# Patient Record
Sex: FEMALE | Race: WHITE
[De-identification: names, ages, dates, MRNs, and addresses within clinical notes are randomized per-mention and may not be internally consistent; named-entity substitution may affect disease eponyms.]

---

## 2018-10-25 ENCOUNTER — HOSPITAL ENCOUNTER (INPATIENT)
Dept: HOSPITAL 74 - FER | Age: 83
LOS: 5 days | Discharge: NURSING, REHAB OR LONG-TERM CARE | DRG: 470 | End: 2018-10-30
Attending: NURSE PRACTITIONER | Admitting: INTERNAL MEDICINE
Payer: COMMERCIAL

## 2018-10-25 VITALS — BODY MASS INDEX: 19.5 KG/M2
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
## 2018-10-25 DIAGNOSIS — Z96.641: ICD-10-CM

## 2018-10-25 DIAGNOSIS — S72.112A: Primary | ICD-10-CM
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
## 2018-10-25 DIAGNOSIS — E83.42: ICD-10-CM

## 2018-10-25 DIAGNOSIS — D62: ICD-10-CM

## 2018-10-25 DIAGNOSIS — N39.0: ICD-10-CM

## 2018-10-25 DIAGNOSIS — S72.122A: ICD-10-CM
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
## 2018-10-25 LAB
ALBUMIN SERPL-MCNC: 3.8 G/DL (ref 3.5–5)
ALP SERPL-CCNC: 71 U/L (ref 32–92)
ALT SERPL-CCNC: 20 U/L (ref 10–40)
ANION GAP SERPL CALC-SCNC: 8 MMOL/L (ref 8–16)
AST SERPL-CCNC: 21 U/L (ref 10–42)
BACTERIA #/AREA URNS HPF: (no result) /HPF
BILIRUB SERPL-MCNC: 0.4 MG/DL (ref 0.2–1)
BUN SERPL-MCNC: 25 MG/DL (ref 7–18)
CALCIUM SERPL-MCNC: 9.1 MG/DL (ref 8.4–10.2)
CHLORIDE SERPL-SCNC: 102 MMOL/L (ref 98–107)
CO2 SERPL-SCNC: 26 MMOL/L (ref 22–28)
CREAT SERPL-MCNC: 0.7 MG/DL (ref 0.6–1.3)
DEPRECATED RDW RBC AUTO: 14.3 % (ref 11.6–15.6)
GLUCOSE SERPL-MCNC: 113 MG/DL (ref 74–106)
HCT VFR BLD CALC: 38.7 % (ref 32.4–45.2)
HGB BLD-MCNC: 12.9 GM/DL (ref 10.7–15.3)
INR BLD: 1.06 (ref 0.82–1.09)
KETONES UR QL STRIP: (no result)
LEUKOCYTE ESTERASE UR QL STRIP.AUTO: (no result)
MCH RBC QN AUTO: 30 PG (ref 25.7–33.7)
MCHC RBC AUTO-ENTMCNC: 33.3 G/DL (ref 32–36)
MCV RBC: 90 FL (ref 80–96)
PH UR: 6.5 [PH] (ref 4.5–8)
PLATELET # BLD AUTO: 268 K/MM3 (ref 134–434)
PLATELET BLD QL SMEAR: ADEQUATE
PMV BLD: 8.4 FL (ref 7.5–11.1)
POTASSIUM SERPLBLD-SCNC: 4.1 MMOL/L (ref 3.5–5.1)
PROT SERPL-MCNC: 7.1 G/DL (ref 6.4–8.3)
PT PNL PPP: 11.9 SEC (ref 10.2–13)
RBC # BLD AUTO: (no result) /HPF (ref 0–3)
RBC # BLD AUTO: 4.3 M/MM3 (ref 3.6–5.2)
SODIUM SERPL-SCNC: 136 MMOL/L (ref 136–145)
SP GR UR: 1.02 (ref 1.01–1.03)
UROBILINOGEN UR STRIP-MCNC: 0.2 MG/DL (ref 0.2–1)
WBC # BLD AUTO: 14.9 K/MM3 (ref 4–10.8)
WBC # UR AUTO: (no result) /UL (ref 0–5)

## 2018-10-25 NOTE — PDOC
History of Present Illness





- General


History Source: Patient


Exam Limitations: No Limitations





- History of Present Illness


Initial Comments: 





10/25/18 21:05


The patient is a 86 year old female brought via EMS and presenting with care 

taker, with a significant past medical history of right hip replacement, who 

presents to the ED after a fall 4 hours prior to presentation. She notes that 

she landed on her left side. On baseline the patient does ambulate with a 

walker but she notes that she was not able to get up on her own and has not 

been able to ambulate due to the pain. She denies any dizziness or tripping. 

She denies any head trauma, loss of consciousness or any other kind of injuries.





The patient denies chest pain, shortness of breath, headache and dizziness. 

Denies fever, chills, nausea, vomiting, diarrhea or constipation. Denies dysuria

, frequency, urgency and hematuria. 





Allergies: None 


Past surgical history: Right hip replacement 


Social History: No alcohol, tobacco or drug use reported 


PMD: Dr. Dylon Dodge








<Ruben Coburn - Last Filed: 10/25/18 22:06>





<Aby Rios - Last Filed: 10/26/18 00:00>





- General


Chief Complaint: Injury


Stated Complaint: FALL


Time Seen by Provider: 10/25/18 19:48





Past History





<Ruben Coburn - Last Filed: 10/25/18 22:06>





<Aby Rios - Last Filed: 10/26/18 00:00>





- Past Medical History


Allergies/Adverse Reactions: 


 Allergies











Allergy/AdvReac Type Severity Reaction Status Date / Time


 


No Known Allergies Allergy   Unverified 10/25/18 19:46











Home Medications: 


Ambulatory Orders





Aspirin [Ecotrin] 81 mg PO DAILY 10/25/18 


Ibuprofen 400 mg PO ONCE 10/25/18 











**Review of Systems





- Review of Systems


Able to Perform ROS?: Yes


Comments:: 





10/25/18 21:04


GENERAL/CONSTITUTIONAL: No fever or chills. No weakness.


HEAD, EYES, EARS, NOSE AND THROAT: No change in vision. No ear pain or 

discharge. No sore throat.


GASTROINTESTINAL: No nausea, vomiting, diarrhea or constipation.


GENITOURINARY: No dysuria, frequency, or change in urination.


CARDIOVASCULAR: No chest pain or shortness of breath.


RESPIRATORY: No cough, wheezing, or hemoptysis.


MUSCULOSKELETAL: (+) Left hip pain. No neck or back pain.


SKIN: No rash


NEUROLOGIC: No headache, vertigo, loss of consciousness, or change in strength/

sensation.


ENDOCRINE: No increased thirst. No abnormal weight change.


HEMATOLOGIC/LYMPHATIC: No anemia, easy bleeding, or history of blood clots.


ALLERGIC/IMMUNOLOGIC: No hives or skin allergy.








<Ruben Coburn - Last Filed: 10/25/18 22:06>





*Physical Exam





- Vital Signs


 Last Vital Signs











Temp Pulse Resp BP Pulse Ox


 


 97.8 F   88   16   121/84   100 


 


 10/25/18 19:48  10/25/18 19:48  10/25/18 19:48  10/25/18 19:48  10/25/18 19:48














- Physical Exam


Comments: 





10/25/18 21:04








Constitutional: Awake, alert, oriented.  No acute distress.


Head:  Normocephalic.  Atraumatic


Eyes:  PERRL. EOMI.  Conjunctivae are not pale.


ENT:  Mucous membranes are moist and intact. Posterior pharynx without exudates 

or erythema. Uvula midline.


Neck:  Supple.  Full ROM. No lymphadenopathy.


Cardiovascular:  Regular rate.  Regular rhythm. S1, S2 regular.  Distal pulses 

are 2+ and symmetric.  


Pulmonary/Chest:  No evidence of respiratory distress.  Clear to auscultation 

bilaterally  No wheezing, rales or rhonchi.


Abdominal:  Soft and non-distended.  There is no tenderness.  No rebound, 

guarding or rigidity.  No organomegaly. No palpable masses. Good bowel sounds.


Back:  No CVA tenderness.


Musculoskeletal: (+) Left leg externally rotated and shortened, moderate 

tenderness to the anterior hip. 2+ Edema to mid anterior tibia surface 

bilaterally. No cyanosis.  No clubbing. Nocalf tenderness. Radial/pedal pulses 

are intact and 2+ bilaterally


Skin:  Skin is warm and dry.  No petechiae.  No purpura.  


Neurological:  Alert and oriented to person, place, and time.  Cranial nerves II

-XII are grossly intact. Normal speech. Strength is grossly symmetric. No 

sensory deficits.


Psychiatric:  Good eye contact.  Normal interaction, affect and behavior.








<Ruben Coburn - Last Filed: 10/25/18 22:06>





ED Treatment Course





- LABORATORY


CBC & Chemistry Diagram: 


 10/25/18 21:10





 10/25/18 21:10





<Ruben Coburn - Last Filed: 10/25/18 22:06>





- LABORATORY


CBC & Chemistry Diagram: 


 10/25/18 21:10





 10/25/18 21:10





<Aby Rios - Last Filed: 10/26/18 00:00>





Progress Note





- Progress Note


Progress Note: 





Documentation has been prepared under my direction and personally reviewed by 

me in its entirety. I attest that this documented accurately reflects all work, 

treatment, procedures and medical decision making performed by me.





<Aby Rios - Last Filed: 10/26/18 00:00>





Medical Decision Making





- Medical Decision Making





10/25/18 23:48


As noted above, this 86-year-old woman presents with history of falling at home

, impacting left hip area; this occurred approximately 4 PM earlier today.  

After the injury, patient was unable to weight-bear secondary to pain in her 

left hip area and there was no LOC; patient who was alert and oriented 3 

denies headache, neck pain, chest or abdominal pain.  Exam as noted





Left hip/pelvis x-ray confirms a femoral neck fracture with varus deformity.  

Portable chest x-ray shows evidence of chronic interstitial changes; no 

infiltrate or effusions present.  There is density of unclear etiology present 

in the lateral aspect of the right upper lobe.





Electrocardiogram performed interpreted by me: Normal sinus rhythm at 76 bpm, 

intervals, axis and wave forms are all normal; no evidence of acute ST or T-

wave abnormalities.





Laboratory evaluation notable for white blood cell count of 14,900 with a 

predominance of neutrophils.  The remainder of the CBC is normal.  INR is also 

normal at 1.06.  Chemistry profile is essentially normal except for evidence of 

mild prerenal azotemia with a BUN of 25 and creatinine 0.7.


Urinalysis microscopic notable for 25 WBC, 25 RBC, 1+ bacteria.  Urine sent 

for C&S





Results discussed with the patient.  She was initially unsure whether she would 

would consent to admission and surgery here; she asked for  to be 

informed of her injury.  She subsequently spoke to Dr. Dodge by telephone and 

then consented for admission and treatment.





[facility]





Case discussed with Dr.Oh of orthopedic service.  Dr. Bailey will see patient 

in the morning.  She should be NPO after midnight








<Aby Rios - Last Filed: 10/26/18 00:00>





*DC/Admit/Observation/Transfer





- Attestations


Scribe Attestion: 





10/25/18 21:05





Documentation prepared by Ruben Coburn, acting as medical scribe for Aby Rios MD





<Ruben Coburn - Last Filed: 10/25/18 22:06>





- Discharge Dispostion


Decision to Admit order: Yes





<Aby Rios - Last Filed: 10/26/18 00:00>


Diagnosis at time of Disposition: 


Hip fracture


Qualifiers:


 Encounter type: initial encounter Fracture type: closed Laterality: left 

Qualified Code(s): S72.002A - Fracture of unspecified part of neck of left femur

, initial encounter for closed fracture








- Discharge Dispostion


Condition at time of disposition: Guarded

## 2018-10-26 LAB
ALBUMIN SERPL-MCNC: 3.7 G/DL (ref 3.5–5)
ALP SERPL-CCNC: 77 U/L (ref 32–92)
ALT SERPL-CCNC: 17 U/L (ref 10–40)
ANION GAP SERPL CALC-SCNC: 6 MMOL/L (ref 8–16)
AST SERPL-CCNC: 21 U/L (ref 10–42)
BASOPHILS # BLD: 0 % (ref 0–2)
BILIRUB SERPL-MCNC: 0.8 MG/DL (ref 0.2–1)
BUN SERPL-MCNC: 20 MG/DL (ref 7–18)
CALCIUM SERPL-MCNC: 8.9 MG/DL (ref 8.4–10.2)
CHLORIDE SERPL-SCNC: 102 MMOL/L (ref 98–107)
CO2 SERPL-SCNC: 29 MMOL/L (ref 22–28)
CREAT SERPL-MCNC: 0.6 MG/DL (ref 0.6–1.3)
DEPRECATED RDW RBC AUTO: 14.2 % (ref 11.6–15.6)
EOSINOPHIL # BLD: 0.7 % (ref 0–4.5)
GLUCOSE SERPL-MCNC: 103 MG/DL (ref 74–106)
HCT VFR BLD CALC: 36.4 % (ref 32.4–45.2)
HGB BLD-MCNC: 12.2 GM/DL (ref 10.7–15.3)
LYMPHOCYTES # BLD: 10.3 % (ref 8–40)
MAGNESIUM SERPL-MCNC: 1.9 MG/DL (ref 1.8–2.4)
MCH RBC QN AUTO: 30.2 PG (ref 25.7–33.7)
MCHC RBC AUTO-ENTMCNC: 33.5 G/DL (ref 32–36)
MCV RBC: 90.1 FL (ref 80–96)
MONOCYTES # BLD AUTO: 6.5 % (ref 3.8–10.2)
NEUTROPHILS # BLD: 82.5 % (ref 42.8–82.8)
PHOSPHATE SERPL-MCNC: 3 MG/DL (ref 2.5–4.6)
PLATELET # BLD AUTO: 257 K/MM3 (ref 134–434)
PMV BLD: 8.3 FL (ref 7.5–11.1)
POTASSIUM SERPLBLD-SCNC: 3.9 MMOL/L (ref 3.5–5.1)
PROT SERPL-MCNC: 6.9 G/DL (ref 6.4–8.3)
RBC # BLD AUTO: 4.04 M/MM3 (ref 3.6–5.2)
SODIUM SERPL-SCNC: 137 MMOL/L (ref 136–145)
WBC # BLD AUTO: 9.4 K/MM3 (ref 4–10.8)

## 2018-10-26 PROCEDURE — 0SRS019 REPLACEMENT OF LEFT HIP JOINT, FEMORAL SURFACE WITH METAL SYNTHETIC SUBSTITUTE, CEMENTED, OPEN APPROACH: ICD-10-PCS | Performed by: ORTHOPAEDIC SURGERY

## 2018-10-26 RX ADMIN — Medication SCH TAB: at 21:14

## 2018-10-26 RX ADMIN — FAMOTIDINE SCH MLS/HR: 20 INJECTION, SOLUTION INTRAVENOUS at 11:15

## 2018-10-26 RX ADMIN — FAMOTIDINE SCH MLS/HR: 20 INJECTION, SOLUTION INTRAVENOUS at 21:14

## 2018-10-26 RX ADMIN — Medication SCH TAB: at 12:35

## 2018-10-26 RX ADMIN — DEXTROSE AND SODIUM CHLORIDE SCH MLS/HR: 5; 900 INJECTION, SOLUTION INTRAVENOUS at 10:08

## 2018-10-26 RX ADMIN — CEFTRIAXONE SCH MLS/HR: 1 INJECTION, POWDER, FOR SOLUTION INTRAMUSCULAR; INTRAVENOUS at 11:15

## 2018-10-26 RX ADMIN — HEPARIN SODIUM SCH UNIT: 5000 INJECTION, SOLUTION INTRAVENOUS; SUBCUTANEOUS at 06:24

## 2018-10-26 RX ADMIN — HEPARIN SODIUM SCH: 5000 INJECTION, SOLUTION INTRAVENOUS; SUBCUTANEOUS at 13:18

## 2018-10-26 NOTE — EKG
Test Reason : 

Blood Pressure : ***/*** mmHG

Vent. Rate : 076 BPM     Atrial Rate : 076 BPM

   P-R Int : 134 ms          QRS Dur : 090 ms

    QT Int : 398 ms       P-R-T Axes : 070 039 045 degrees

   QTc Int : 447 ms

 

NORMAL SINUS RHYTHM

POSSIBLE LEFT ATRIAL ENLARGEMENT

NO PREVIOUS ECGS AVAILABLE

Confirmed by JULIA SOLARES, JOSE ([id number]) on 10/26/2018 9:27:00 AM

 

Referred By: DR CRUZ           Confirmed By:JOSE DUMONT MD

## 2018-10-26 NOTE — HP
CHIEF COMPLAINT:  left hip pain





PCP: Dr Dodge 





HISTORY OF PRESENT ILLNESS:Patient is a 87 y/o female with a past surgical 

history of a right hip replacement. She reports yesterday (10/25/18) she 

tripped and fell onto her left hip.  Patient is able to recall the full 

incident in detail and denies striking her head.  Of note, patient does report 

feeling off balance within the past several months and was evaluated by a 

neurologist at [facility].  She underwent head ct and mri of brain which resulted as 

negative as per patient.   She reports left hip pain and was brought to the 

emergency department for further evaluation.  





ER course was notable for:


(1)xray of left hip/pelvis: +femoral neck fracture 


(2)chest xray: no infilitrate no effusions noted 


(3)ekg nsr    





Recent Travel: none 





PAST MEDICAL HISTORY:see hpi 





PAST SURGICAL HISTORY: see hpi 





Social History:  resides at home with son 


Smoking:none


Alcohol:none 


Drugs: none 





Family History: non-contributory to this admission 


Allergies





No Known Allergies Allergy (Unverified 10/25/18 19:46)


 








HOME MEDICATIONS:


 Home Medications











 Medication  Instructions  Recorded


 


Aspirin [Ecotrin] 81 mg PO DAILY 10/25/18


 


Ibuprofen 400 mg PO ONCE 10/25/18








REVIEW OF SYSTEMS


CONSTITUTIONAL: 


Absent:  fever, chills, diaphoresis, generalized weakness, malaise, loss of 

appetite, weight change


HEENT: 


Absent:  rhinorrhea, nasal congestion, throat pain, throat swelling, difficulty 

swallowing, mouth swelling, ear pain, eye pain, visual changes


CARDIOVASCULAR: 


Absent: chest pain, syncope, palpitations, irregular heart rate, lightheadedness

, peripheral edema


RESPIRATORY: 


Absent: cough, shortness of breath, dyspnea with exertion, orthopnea, wheezing, 

stridor, hemoptysis


GASTROINTESTINAL:


Absent: abdominal pain, abdominal distension, nausea, vomiting, diarrhea, 

constipation, melena, hematochezia


GENITOURINARY: 


Absent: dysuria, frequency, urgency, hesitancy, hematuria, flank pain, genital 

pain


MUSCULOSKELETAL: 


Present: left hip pain 


Absent: myalgia, arthralgia, joint swelling, back pain, neck pain


SKIN: 


Absent: rash, itching, pallor


HEMATOLOGIC/IMMUNOLOGIC: 


Absent: easy bleeding, easy bruising, lymphadenopathy, frequent infections


ENDOCRINE:


Absent: unexplained weight gain, unexplained weight loss, heat intolerance, 

cold intolerance


NEUROLOGIC: 


Absent: headache, focal weakness or paresthesias, dizziness, unsteady gait, 

seizure, mental status changes, bladder or bowel incontinence


PSYCHIATRIC: 


Absent: anxiety, depression, suicidal or homicidal ideation, hallucinations.








PHYSICAL EXAMINATION


 Vital Signs - 24 hr











  10/25/18 10/25/18 10/26/18





  19:48 23:42 06:00


 


Temperature 97.8 F 97.7 F 98.1 F


 


Pulse Rate 88 85 73


 


Respiratory 16 18 18





Rate   


 


Blood Pressure 121/84 146/71 152/73


 


O2 Sat by Pulse 100 99 99





Oximetry (%)   











GENERAL: Awake, alert, and fully oriented, in no acute distress.


HEAD: Normal with no signs of trauma.


EYES: Pupils equal, round and reactive to light, extraocular movements intact, 

sclera anicteric, conjunctiva clear. No lid lag.


EARS, NOSE, THROAT: Ears normal, nares patent, oropharynx clear without 

exudates. dry mucous membranes.


NECK: Normal range of motion, supple without lymphadenopathy, JVD, or masses.


LUNGS: Breath sounds equal, clear to auscultation bilaterally. No wheezes, and 

no crackles. No accessory muscle use.


HEART: Regular rate and rhythm, normal S1 and S2 without murmur, rub or gallop.


ABDOMEN: Soft, nontender, not distended, normoactive bowel sounds, no guarding, 

no rebound, no masses.  No hepatomegaly or  splenomegaly. 


MUSCULOSKELETAL: Normal range of motion at all joints. No bony deformities or 

tenderness. No CVA tenderness.


UPPER EXTREMITIES: 2+ pulses, warm, well-perfused. No cyanosis. No clubbing. No 

peripheral edema.


LOWER EXTREMITIES: left lower extremity--> shortened, externally rotated, 2+ 

pedal pulses, warm, well-perfused. No calf tenderness. No peripheral edema. 


NEUROLOGICAL:  Cranial nerves II-XII intact. Normal speech. Normal gait.


PSYCHIATRIC: Cooperative. Good eye contact. Appropriate mood and affect.


SKIN: Warm, dry, normal turgor, no rashes or lesions noted, normal capillary 

refill. 


 Laboratory Results - last 24 hr











  10/25/18 10/25/18 10/25/18





  21:10 21:10 21:10


 


WBC  14.9 H  


 


RBC  4.30  


 


Hgb  12.9  


 


Hct  38.7  


 


MCV  90.0  


 


MCH  30.0  


 


MCHC  33.3  


 


RDW  14.3  


 


Plt Count  268  


 


MPV  8.4  


 


Absolute Neuts (auto)  13.6  


 


Neutrophils %  No Result Required.  


 


Neutrophils % (Manual)  93.0 H*  


 


Band Neutrophils %  2.0  


 


Lymphocytes %  No Result Required.  


 


Lymphocytes % (Manual)  3.0 L  


 


Monocytes % (Manual)  2 L  


 


Platelet Estimate  Adequate  


 


PT with INR    11.9


 


INR    1.06


 


Sodium   136 


 


Potassium   4.1 


 


Chloride   102 


 


Carbon Dioxide   26 


 


Anion Gap   8 


 


BUN   25 H 


 


Creatinine   0.7 


 


Creat Clearance w eGFR   > 60 


 


Random Glucose   113 H 


 


Calcium   9.1 


 


Total Bilirubin   0.4 


 


AST   21 


 


ALT   20 


 


Alkaline Phosphatase   71 


 


Total Protein   7.1 


 


Albumin   3.8 


 


Urine Color   


 


Urine Appearance   


 


Urine pH   


 


Ur Specific Gravity   


 


Urine Protein   


 


Urine Glucose (UA)   


 


Urine Ketones   


 


Urine Blood   


 


Urine Nitrite   


 


Urine Bilirubin   


 


Urine Urobilinogen   


 


Ur Leukocyte Esterase   


 


Urine RBC   


 


Urine WBC   


 


Urine Bacteria   


 


Anti-A Titer   


 


Blood Type   


 


Antibody Screen   














  10/25/18 10/25/18 10/25/18





  22:38 23:06 23:30


 


WBC   


 


RBC   


 


Hgb   


 


Hct   


 


MCV   


 


MCH   


 


MCHC   


 


RDW   


 


Plt Count   


 


MPV   


 


Absolute Neuts (auto)   


 


Neutrophils %   


 


Neutrophils % (Manual)   


 


Band Neutrophils %   


 


Lymphocytes %   


 


Lymphocytes % (Manual)   


 


Monocytes % (Manual)   


 


Platelet Estimate   


 


PT with INR   


 


INR   


 


Sodium   


 


Potassium   


 


Chloride   


 


Carbon Dioxide   


 


Anion Gap   


 


BUN   


 


Creatinine   


 


Creat Clearance w eGFR   


 


Random Glucose   


 


Calcium   


 


Total Bilirubin   


 


AST   


 


ALT   


 


Alkaline Phosphatase   


 


Total Protein   


 


Albumin   


 


Urine Color  Yellow  


 


Urine Appearance  Clear  


 


Urine pH  6.5  


 


Ur Specific Gravity  1.020  


 


Urine Protein  Negative  


 


Urine Glucose (UA)  Negative  


 


Urine Ketones  1+ H  


 


Urine Blood  Trace-lysed H  


 


Urine Nitrite  Negative  


 


Urine Bilirubin  Negative  


 


Urine Urobilinogen  0.2  


 


Ur Leukocyte Esterase  Trace H  


 


Urine RBC  2-5  


 


Urine WBC  2-5  


 


Urine Bacteria  1+  


 


Anti-A Titer   Cancelled 


 


Blood Type   Cancelled  O POSITIVE


 


Antibody Screen   Cancelled  Negative














  10/26/18





  00:00


 


WBC 


 


RBC 


 


Hgb 


 


Hct 


 


MCV 


 


MCH 


 


MCHC 


 


RDW 


 


Plt Count 


 


MPV 


 


Absolute Neuts (auto) 


 


Neutrophils % 


 


Neutrophils % (Manual) 


 


Band Neutrophils % 


 


Lymphocytes % 


 


Lymphocytes % (Manual) 


 


Monocytes % (Manual) 


 


Platelet Estimate 


 


PT with INR 


 


INR 


 


Sodium 


 


Potassium 


 


Chloride 


 


Carbon Dioxide 


 


Anion Gap 


 


BUN 


 


Creatinine 


 


Creat Clearance w eGFR 


 


Random Glucose 


 


Calcium 


 


Total Bilirubin 


 


AST 


 


ALT 


 


Alkaline Phosphatase 


 


Total Protein 


 


Albumin 


 


Urine Color 


 


Urine Appearance 


 


Urine pH 


 


Ur Specific Gravity 


 


Urine Protein 


 


Urine Glucose (UA) 


 


Urine Ketones 


 


Urine Blood 


 


Urine Nitrite 


 


Urine Bilirubin 


 


Urine Urobilinogen 


 


Ur Leukocyte Esterase 


 


Urine RBC 


 


Urine WBC 


 


Urine Bacteria 


 


Anti-A Titer 


 


Blood Type  O POSITIVE


 


Antibody Screen 











ASSESSMENT/PLAN:





1) MS


left hip fracture s/p mechanical fall


- case discussed with Dr Bailey, orthopedist, patient is pending OR today 


- prn pain medication


- npo-->ivf





2) 


uti


- urinalysis is notable for +1 leukocytes, will start rocephin pending urine 

culture


- leukocytosis is noted, patient is afebrile, close following





f/e/n


- npo-->ivf


- replete electrolytes prn 





ppx


- no chemical ac pending OR-->mechanical AC only


- pepcid 








**patient is medically optimized for emergent procedure**





dispo: pt requires inpatient admission 





Visit type





- Emergency Visit


Emergency Visit: Yes


ED Registration Date: 10/25/18


Care time: The patient presented to the Emergency Department on the above date 

and was hospitalized for further evaluation of their emergent condition.





- New Patient


This patient is new to me today: Yes


Date on this admission: 10/26/18





- Critical Care


Critical Care patient: No

## 2018-10-27 LAB
ANION GAP SERPL CALC-SCNC: 6 MMOL/L (ref 8–16)
BASOPHILS # BLD: 0 % (ref 0–2)
BUN SERPL-MCNC: 16 MG/DL (ref 7–18)
CALCIUM SERPL-MCNC: 8.3 MG/DL (ref 8.4–10.2)
CHLORIDE SERPL-SCNC: 100 MMOL/L (ref 98–107)
CO2 SERPL-SCNC: 26 MMOL/L (ref 22–28)
CREAT SERPL-MCNC: 0.7 MG/DL (ref 0.6–1.3)
DEPRECATED RDW RBC AUTO: 13.9 % (ref 11.6–15.6)
EOSINOPHIL # BLD: 0.1 % (ref 0–4.5)
GLUCOSE SERPL-MCNC: 159 MG/DL (ref 74–106)
HCT VFR BLD CALC: 27.4 % (ref 32.4–45.2)
HGB BLD-MCNC: 9.2 GM/DL (ref 10.7–15.3)
LYMPHOCYTES # BLD: 5.9 % (ref 8–40)
MAGNESIUM SERPL-MCNC: 1.5 MG/DL (ref 1.8–2.4)
MCH RBC QN AUTO: 30.5 PG (ref 25.7–33.7)
MCHC RBC AUTO-ENTMCNC: 33.6 G/DL (ref 32–36)
MCV RBC: 90.5 FL (ref 80–96)
MONOCYTES # BLD AUTO: 8.6 % (ref 3.8–10.2)
NEUTROPHILS # BLD: 85.4 % (ref 42.8–82.8)
PHOSPHATE SERPL-MCNC: 2.4 MG/DL (ref 2.5–4.6)
PLATELET # BLD AUTO: 229 K/MM3 (ref 134–434)
PMV BLD: 8.4 FL (ref 7.5–11.1)
POTASSIUM SERPLBLD-SCNC: 3.8 MMOL/L (ref 3.5–5.1)
RBC # BLD AUTO: 3.02 M/MM3 (ref 3.6–5.2)
SODIUM SERPL-SCNC: 132 MMOL/L (ref 136–145)
WBC # BLD AUTO: 11.1 K/MM3 (ref 4–10.8)

## 2018-10-27 RX ADMIN — DEXTROSE AND SODIUM CHLORIDE SCH MLS/HR: 5; 900 INJECTION, SOLUTION INTRAVENOUS at 09:30

## 2018-10-27 RX ADMIN — HEPARIN SODIUM SCH: 5000 INJECTION, SOLUTION INTRAVENOUS; SUBCUTANEOUS at 06:23

## 2018-10-27 RX ADMIN — CEFAZOLIN SODIUM SCH MLS/HR: 1 INJECTION, SOLUTION INTRAVENOUS at 00:30

## 2018-10-27 RX ADMIN — FERROUS SULFATE TAB EC 324 MG (65 MG FE EQUIVALENT) SCH MG: 324 (65 FE) TABLET DELAYED RESPONSE at 16:55

## 2018-10-27 RX ADMIN — CEFAZOLIN SODIUM SCH MLS/HR: 1 INJECTION, SOLUTION INTRAVENOUS at 08:05

## 2018-10-27 RX ADMIN — FAMOTIDINE SCH MLS/HR: 20 INJECTION, SOLUTION INTRAVENOUS at 10:15

## 2018-10-27 RX ADMIN — Medication SCH TAB: at 09:23

## 2018-10-27 RX ADMIN — CEFTRIAXONE SCH MLS/HR: 1 INJECTION, POWDER, FOR SOLUTION INTRAMUSCULAR; INTRAVENOUS at 09:10

## 2018-10-27 RX ADMIN — Medication SCH TAB: at 21:49

## 2018-10-27 NOTE — OP
DATE OF OPERATION: 

 

DATE OF DICTATION:  10/26/2018

 

PREOPERATIVE DIAGNOSIS:  Left hip basicervical fracture.

 

POSTOPERATIVE DIAGNOSIS:  Left hip basicervical fracture.

 

PROCEDURE:  Left hip hemiarthroplasty.

 

SURGEON:  Nadja Jackman MD 

 

ANESTHESIA:  Spinal.

 

POSTOPERATIVE CONDITION:  Stable.

 

COMPLICATIONS:  None.

 

ASSISTANT:  IRASEMA Dunn, whose skilled full assistance was necessary for the safe

and timely performance of this procedure.  Ms. Meza was able to help provide limb

positioning, retraction, assist in the insertion and fixation of orthopaedic fixation

hardware.  

 

IMPLANTS:  Alban cemented stem size 2 with +5 neck insert and bipolar 46-mm head.

 

INDICATIONS:  This is a pleasant 86-year-old female who suffered a fall resulting in

a displaced to avoid internally rotating her feet.  The patient was found to have a

displaced basicervical fracture with extensions into both femoral neck and into the

greater and lesser trochanter.  Given these findings, treatment options were

discussed including IM nail, hemiarthroplasty, and total hip arthroplasty.  Given the

patient is a household ambulator but not high-demand ambulator and given the fact of

the comminution present, it was felt that a hemiarthroplasty would provide for the

best restoration of limb anatomy and preserve walking ability while avoiding

excessive morbidity.  I reviewed surgical risks in detail with the patient including

bleeding, infection, neurovascular injury, need for further surgery, postoperative

pain or stiffness, limb length discrepancy, rotational deformity, hip instability,

periprosthetic fracture.  We reviewed medical risks such as heart attack, stroke,

DVT, PE, and death.  I discussed the use of perioperative antibiotic and DVT

prophylaxis.  I reviewed the postoperative recovery process and need for physical

therapy.  I addressed all of the patient's questions and concerns.  She voiced

understanding and elected to proceed.

 

DESCRIPTION OF PROCEDURE:  The patient was brought to the operating room where spinal

anesthetic was administered.  The patient was placed into the lateral decubitus

position careful to pad all of the bony prominences.  The left lower extremity was

then prepped and draped in the usual sterile fashion.  A preoperative dose of

antibiotics was given, and the usual time-out procedure was performed.  

 

At this time, an incision was planned out over the greater trochanter.  This was

carried down through the skin and subcutaneous tissue.  Blunt spreading was used to

expose the fascia.  The fascia was then split in line with its fibers.  A Charnley

retractor was placed exposing the greater trochanter.  Immediately, a large hematoma

was encountered.  The bursa was then reflected posteriorly.  This exposed the greater

trochanter, which was now seen to be highly comminuted.  Dissecting posteriorly, some

of the fracture fragments, which were loose were now excised.  The main fragment of

the greater trochanter was now reflected anteriorly.  It was essentially a shallow

head bone.  The posterior aspect of the neck was now cut with an oscillating saw. 

The fracture extended down to the lesser trochanter, which required no further cut

anteriorly.  The corkscrew was now inserted into the neck and into the head. 

Electrocautery was used to dissect any remaining soft tissue attachments.  The head

was excised.  A 40-mm head was measured.  A trial was then placed in, and good

suction was able to be achieved.  Any remaining comminution was now debrided.  Two

No. 1 Vicryl sutures were placed into the remnant of the posterior capsule, which was

somewhat shredded as a result of the fracture.  Attention was now turned to the

proximal femur.  A femoral elevator was placed.  A canal finder was passed down the

canal.  The broaching then started with a size 1 and progressed to a size 2 when good

fit was achieved.  The broach was now removed.  A cement restrictor was inserted. 

The cement was then injected into the canal.  The stem was then inserted, and the

cement was allowed to dry.  The trial components were then inserted up to a +5 neck

where good stability was achieved.  At this point, the trial components were removed.

 The bipolar head was then malleted and then inserted and placed onto the Jackson

taper.  The hip was now reduced and passed through a range of motion and once again

found to be stable.  The greater trochanter fragment was now drilled with some 2-0

drill holes.  Utilizing No. 2 FiberWire suture, the fragment was fixed into place. 

The capsule was also fixed into the fragment in order to restore the posterior

stability.  The wound was copiously pulse lavaged at this point.  The fascia was

repaired using No. 1 Vicryl.  The subcutaneous tissue was closed to approximate the

deep using 0 Vicryl and subcutaneous tissue using 2-0 Vicryl.  The skin was closed

using running 3-0 nylon.  Sterile dressings were placed.  The patient was placed onto

an abduction pillow.  She was transferred to the recovery room in stable condition.

 

 

 

NADJA JACKMAN M.D.

 

[id number]

DD: 10/26/2018 18:38

DT: 10/27/2018 07:17

Job #:  [id number]

## 2018-10-27 NOTE — PN
Progress Note (short form)





- Note


Progress Note: 





Pt lying in bed.


Comf at rest.


Pain with any motion.





 Last Vital Signs











Temp Pulse Resp BP Pulse Ox


 


 98.3 F   88   17   100/49 L  98 


 


 10/27/18 10:00  10/27/18 10:00  10/27/18 10:00  10/27/18 10:00  10/27/18 10:00











LLE


dressings cdi


calves soft NT


NVID





 Abnormal Lab Results











  10/27/18 10/27/18





  07:25 07:25


 


WBC  11.1 H 


 


RBC  3.02 L 


 


Hgb  9.2 L 


 


Hct  27.4 L D 


 


Neutrophils %  85.4 H 


 


Lymphocytes %  5.9 L 


 


Sodium   132 L


 


Anion Gap   6 L


 


Random Glucose   159 H D


 


Calcium   8.3 L


 


Phosphorus   2.4 L


 


Magnesium   1.5 L











a/p POD 1 L hip hemiarthroplasty


-pain control


-dvt proph


-hct 27, started iron PO, will follow


-oob/PT


-dispo planning, pt would like to go home with full time aide, advised this is 

a reasonable course of action as she has home modifications for handicap access

## 2018-10-27 NOTE — PN
Physical Exam: 


SUBJECTIVE: Patient seen and examined. Feels a dull ache in left hip. Pain well-

managed presently.








OBJECTIVE:





 Vital Signs











 Period  Temp  Pulse  Resp  BP Sys/Waldrop  Pulse Ox


 


 Last 24 Hr  97.6 F-98.3 F  70-96  16-18  /41-63  











GENERAL: The patient is awake, alert, and fully oriented, in no acute distress.


LUNGS: Breath sounds equal, clear to auscultation bilaterally, no wheezes, no 

crackles, no accessory muscle use. 


HEART: Regular rate and rhythm, S1, S2 


ABDOMEN: Soft, nontender, nondistended


LOWER EXTREMITIES: Wedge immobilizer, SCDs/TEDs bilaterally; toes are warm, well

-perfused, can flex and extend, 5/5 sensory; 1+ left pedal edema 


NEUROLOGICAL: Cranial nerves II through XII grossly intact. Normal speech.


SKIN: Warm, dry, normal turgor














 Laboratory Results - last 24 hr











  10/26/18 10/26/18 10/27/18





  10:20 10:20 07:25


 


WBC  9.4   11.1 H


 


RBC  4.04   3.02 L


 


Hgb  12.2   9.2 L


 


Hct  36.4   27.4 L D


 


MCV  90.1   90.5


 


MCH  30.2   30.5


 


MCHC  33.5   33.6


 


RDW  14.2   13.9


 


Plt Count  257   229


 


MPV  8.3   8.4


 


Absolute Neuts (auto)  7.7   9.4


 


Neutrophils %  82.5   85.4 H


 


Lymphocytes %  10.3   5.9 L


 


Monocytes %  6.5   8.6


 


Eosinophils %  0.7   0.1


 


Basophils %  0.0   0.0


 


Sodium   137 


 


Potassium   3.9 


 


Chloride   102 


 


Carbon Dioxide   29 H 


 


Anion Gap   6 L 


 


BUN   20 H 


 


Creatinine   0.6 


 


Creat Clearance w eGFR   > 60 


 


Random Glucose   103 


 


Calcium   8.9 


 


Phosphorus   3.0 


 


Magnesium   1.9 


 


Total Bilirubin   0.8 


 


AST   21 


 


ALT   17 


 


Alkaline Phosphatase   77 


 


Total Protein   6.9 


 


Albumin   3.7 














  10/27/18





  07:25


 


WBC 


 


RBC 


 


Hgb 


 


Hct 


 


MCV 


 


MCH 


 


MCHC 


 


RDW 


 


Plt Count 


 


MPV 


 


Absolute Neuts (auto) 


 


Neutrophils % 


 


Lymphocytes % 


 


Monocytes % 


 


Eosinophils % 


 


Basophils % 


 


Sodium  132 L


 


Potassium  3.8


 


Chloride  100


 


Carbon Dioxide  26


 


Anion Gap  6 L


 


BUN  16


 


Creatinine  0.7


 


Creat Clearance w eGFR  > 60


 


Random Glucose  159 H D


 


Calcium  8.3 L


 


Phosphorus  2.4 L


 


Magnesium  1.5 L


 


Total Bilirubin 


 


AST 


 


ALT 


 


Alkaline Phosphatase 


 


Total Protein 


 


Albumin 








                           Active Medications











Generic Name Dose Route Start Last Admin





  Trade Name Ronakq  PRN Reason Stop Dose Admin


 


Acetaminophen  650 mg  10/26/18 00:50  10/26/18 20:00





  Tylenol -  PO   650 mg





  Q6H PRN   Administration





  PAIN LEVEL 1-5   





     





     





     


 


Acetaminophen  1,000 mg  10/26/18 12:08  10/27/18 07:09





  Ofirmev Injection -  IVPB   1,000 mg





  Q6H PRN   Administration





  PAIN LEVEL 1-5   





     





     





     


 


Calcium Carbonate/Cholecalciferol  1 tab  10/26/18 22:00  10/26/18 21:14





  Os-Rodger 500+D -  PO   1 tab





  BID JUAN R   Administration





     





     





     





     


 


Enoxaparin Sodium  40 mg  10/27/18 10:00  





  Lovenox -  SQ   





  DAILY JUAN R   





     





     





     





     


 


Heparin Sodium (Porcine)  5,000 unit  10/26/18 06:00  10/27/18 06:23





  Heparin -  SQ   Not Given





  TID JUAN R   





     





     





     





     


 


Dextrose/Sodium Chloride  1,000 mls @ 75 mls/hr  10/26/18 09:15  10/26/18 10:08





  D5-Ns -  IV   75 mls/hr





  ASDIR JUAN R   Administration





     





     





     





     


 


Ceftriaxone Sodium  1 gm in 50 mls @ 200 mls/hr  10/26/18 10:45  10/26/18 11:15





  Rocephin 1gm Ivpb (Pre-Docked)  IVPB   200 mls/hr





  DAILY JUAN R   Administration





     





     





  Protocol   





     


 


Famotidine/Sodium Chloride  20 mg in 50 mls @ 100 mls/hr  10/26/18 11:00  10/26/

18 21:14





  Pepcid 20 Mg Premixed Ivpb -  IVPB   100 mls/hr





  BID JUAN R   Administration





     





     





     





     


 


Lactobacillus Acidophilus  1 tab  10/26/18 12:15  10/26/18 12:35





  Bacid -  PO   1 tab





  DAILY JUAN R   Administration





     





     





     





     


 


Morphine Sulfate  2 mg  10/26/18 12:07  





  Morphine Injection -  IVPUSH   





  Q6H PRN   





  PAIN LEVEL 7 - 10 FOR BREAK TH   





     





     





     


 


Oxycodone HCl  5 mg  10/26/18 03:18  10/27/18 03:00





  Roxicodone -  PO   5 mg





  Q6H PRN   Administration





  PAIN LEVEL 6-10   





     





     





     











ASSESSMENT/PLAN


Left hip fracture s/p hemiarthroplasty


   --POD #1


   --perioperative antibiotics complete


   --pain well-managed with PO meds


   --incentive spirometry





Hypomagnesemia


   --repleted


      


DVT prophylaxis: subq lovenox 





Physical therapy





Dispo: would like to go home with full time aide. Full code. 


   








Visit type





- Emergency Visit


Emergency Visit: Yes


ED Registration Date: 10/25/18


Care time: The patient presented to the Emergency Department on the above date 

and was hospitalized for further evaluation of their emergent condition.





- New Patient


This patient is new to me today: Yes


Date on this admission: 10/27/18





- Critical Care


Critical Care patient: No

## 2018-10-28 LAB
ALBUMIN SERPL-MCNC: 2.7 G/DL (ref 3.5–5)
ALP SERPL-CCNC: 56 U/L (ref 32–92)
ALT SERPL-CCNC: 12 U/L (ref 10–40)
AMORPH PHOS CRY URNS QL MICRO: (no result) /HPF
ANION GAP SERPL CALC-SCNC: 8 MMOL/L (ref 8–16)
AST SERPL-CCNC: 22 U/L (ref 10–42)
BACTERIA #/AREA URNS HPF: (no result) /HPF
BASOPHILS # BLD: 0.2 % (ref 0–2)
BILIRUB SERPL-MCNC: 0.6 MG/DL (ref 0.2–1)
BUN SERPL-MCNC: 16 MG/DL (ref 7–18)
CALCIUM SERPL-MCNC: 8.3 MG/DL (ref 8.4–10.2)
CHLORIDE SERPL-SCNC: 101 MMOL/L (ref 98–107)
CO2 SERPL-SCNC: 27 MMOL/L (ref 22–28)
CREAT SERPL-MCNC: 0.5 MG/DL (ref 0.6–1.3)
DEPRECATED RDW RBC AUTO: 14.8 % (ref 11.6–15.6)
EOSINOPHIL # BLD: 0.3 % (ref 0–4.5)
EPITH CASTS URNS QL MICRO: (no result) /HPF
GLUCOSE SERPL-MCNC: 90 MG/DL (ref 74–106)
HCT VFR BLD CALC: 27.2 % (ref 32.4–45.2)
HGB BLD-MCNC: 8.9 GM/DL (ref 10.7–15.3)
LYMPHOCYTES # BLD: 7.6 % (ref 8–40)
MAGNESIUM SERPL-MCNC: 2 MG/DL (ref 1.8–2.4)
MCH RBC QN AUTO: 29.9 PG (ref 25.7–33.7)
MCHC RBC AUTO-ENTMCNC: 32.5 G/DL (ref 32–36)
MCV RBC: 91.7 FL (ref 80–96)
MONOCYTES # BLD AUTO: 8.1 % (ref 3.8–10.2)
NEUTROPHILS # BLD: 83.8 % (ref 42.8–82.8)
PH UR: 5.5 [PH] (ref 4.5–8)
PLATELET # BLD AUTO: 219 K/MM3 (ref 134–434)
PMV BLD: 8.8 FL (ref 7.5–11.1)
POTASSIUM SERPLBLD-SCNC: 3.8 MMOL/L (ref 3.5–5.1)
PROT SERPL-MCNC: 5.5 G/DL (ref 6.4–8.3)
RBC # BLD AUTO: (no result) /HPF (ref 0–3)
RBC # BLD AUTO: 2.97 M/MM3 (ref 3.6–5.2)
SODIUM SERPL-SCNC: 136 MMOL/L (ref 136–145)
SP GR UR: 1.01 (ref 1.01–1.03)
UROBILINOGEN UR STRIP-MCNC: 0.2 MG/DL (ref 0.2–1)
WBC # BLD AUTO: 14.2 K/MM3 (ref 4–10.8)
WBC # UR AUTO: (no result) /UL (ref 0–5)

## 2018-10-28 RX ADMIN — ACETAMINOPHEN PRN MG: 325 TABLET ORAL at 05:57

## 2018-10-28 RX ADMIN — FERROUS SULFATE TAB EC 324 MG (65 MG FE EQUIVALENT) SCH MG: 324 (65 FE) TABLET DELAYED RESPONSE at 08:17

## 2018-10-28 RX ADMIN — MULTIVITAMIN TABLET SCH TAB: TABLET at 09:03

## 2018-10-28 RX ADMIN — FERROUS SULFATE TAB EC 324 MG (65 MG FE EQUIVALENT) SCH MG: 324 (65 FE) TABLET DELAYED RESPONSE at 18:13

## 2018-10-28 RX ADMIN — Medication SCH TAB: at 21:17

## 2018-10-28 RX ADMIN — ENOXAPARIN SODIUM SCH MG: 40 INJECTION SUBCUTANEOUS at 09:03

## 2018-10-28 RX ADMIN — ACETAMINOPHEN PRN MG: 325 TABLET ORAL at 18:13

## 2018-10-28 RX ADMIN — Medication SCH TAB: at 09:03

## 2018-10-28 NOTE — PN
Physical Exam: 


SUBJECTIVE: Patient seen and examined. Pt reports not feeling well today, c/o 

generalized weakness, denies cp, sob, palpitations, abdominal pain, N/V/D or 

urinary symptoms.








OBJECTIVE:





 Vital Signs











 Period  Temp  Pulse  Resp  BP Sys/Waldrop  Pulse Ox


 


 Last 24 Hr  98.3 F-100.0 F    17-18  100-110/49-52  94-98











GENERAL: The patient is awake, alert, and fully oriented, in no acute distress.


HEAD: Normal with no signs of trauma.


EYES: PERRL, extraocular movements intact, sclera anicteric, conjunctiva clear. 

No ptosis. 


ENT: Ears normal, nares patent, oropharynx clear without exudates, moist mucous 


membranes.


NECK: Trachea midline, full range of motion, supple. 


LUNGS: Breath sounds equal, clear to auscultation bilaterally, no wheezes, no 

crackles, no 


accessory muscle use. 


HEART: Regular rate and rhythm, S1, S2 without murmur, rub or gallop.


ABDOMEN: Soft, nontender, nondistended, normoactive bowel sounds, no guarding, 

no 


rebound, no hepatosplenomegaly, no masses.


EXTREMITIES: Left hip dsg intact, 1-2+ ankle edema, LT>Rt, 2+ pulses, warm, well

-perfused, 


NEUROLOGICAL: Cranial nerves II through XII grossly intact. Normal speech, gait 

not 


observed.


PSYCH: Normal mood, normal affect.


SKIN: Warm, dry, normal turgor, no rashes or lesions noted














 Laboratory Results - last 24 hr











  10/27/18 10/27/18





  07:25 07:25


 


WBC  11.1 H 


 


RBC  3.02 L 


 


Hgb  9.2 L 


 


Hct  27.4 L D 


 


MCV  90.5 


 


MCH  30.5 


 


MCHC  33.6 


 


RDW  13.9 


 


Plt Count  229 


 


MPV  8.4 


 


Absolute Neuts (auto)  9.4 


 


Neutrophils %  85.4 H 


 


Lymphocytes %  5.9 L 


 


Monocytes %  8.6 


 


Eosinophils %  0.1 


 


Basophils %  0.0 


 


Sodium   132 L


 


Potassium   3.8


 


Chloride   100


 


Carbon Dioxide   26


 


Anion Gap   6 L


 


BUN   16


 


Creatinine   0.7


 


Creat Clearance w eGFR   > 60


 


Random Glucose   159 H D


 


Calcium   8.3 L


 


Phosphorus   2.4 L


 


Magnesium   1.5 L








Active Medications











Generic Name Dose Route Start Last Admin





  Trade Name Freq  PRN Reason Stop Dose Admin


 


Acetaminophen  650 mg  10/27/18 22:28  10/28/18 05:57





  Tylenol -  PO   650 mg





  Q6H PRN   Administration





  FEVER   





     





     





     


 


Calcium Carbonate/Cholecalciferol  1 tab  10/27/18 22:00  10/28/18 09:03





  Os-Rodger 500+D -  PO   1 tab





  BID JUAN R   Administration





     





     





     





     


 


Enoxaparin Sodium  40 mg  10/28/18 10:00  10/28/18 09:03





  Lovenox -  SQ   40 mg





  DAILY JUAN R   Administration





     





     





     





     


 


Ferrous Sulfate  325 mg  10/27/18 17:30  10/28/18 08:17





  Feosol -  PO   325 mg





  BIDWM JUAN R   Administration





     





     





     





     


 


Multivitamins/Minerals/Vitamin C  1 tab  10/28/18 10:00  10/28/18 09:03





  Tab-A-Vit -  PO   1 tab





  DAILY JUAN R   Administration





     





     





     





     


 


Oxycodone HCl  2.5 mg  10/27/18 22:27  





  Roxicodone -  PO   





  Q6H PRN   





  PAIN LEVEL 1-5   





     





     





     


 


Oxycodone HCl  5 mg  10/27/18 22:27  





  Roxicodone -  PO   





  Q6H PRN   





  PAIN LEVEL 6-10   





     





     





     





*IMAGING





X'ray of left hip/pelvis: +femoral neck fracture 


chest xray: no infiltrate no effusions noted 


EKG : NSR








 Microbiology





10/26/18 08:00   Urine - Urine Clean Catch   Urine Culture - Final


                            Contaminated: Please Repeat








ASSESSMENT/PLAN:


Patient is a 87 y/o female with a past surgical history of a right hip 

replacement. Admitted with Left femoral neck fracture after a mechanical fall.








*Left hip fracture s/p hemiarthroplasty


-POD #2


- PT eval done 


- pain control


- encouraged to use incentive spirometry


- bowel regimen 


- low grade fever with leukocytosis 


- will recheck UA 


-will f/u on CBC 





* Acute blood loss anemia likely due to hip sx 


- will cont on Fe pills 


- will check stool OB 


- will f/u on CBC 





*Hypomagnesemia- resolved 


- s/p replacement


- will f/u on Bmet     


      


* F/E/N: Regular diet 


Replace electrolytes as needed





*DVT prophylaxis: subq Lovenox 





Dispo: Pt would like to go home with full time aide, pt is not medically stable 

for discharge.Requires Inpatient Care.





   








   





Visit type





- Emergency Visit


Emergency Visit: Yes


ED Registration Date: 10/25/18


Care time: The patient presented to the Emergency Department on the above date 

and was hospitalized for further evaluation of their emergent condition.





- New Patient


This patient is new to me today: Yes


Date on this admission: 10/28/18





- Critical Care


Critical Care patient: No

## 2018-10-29 VITALS — TEMPERATURE: 99 F

## 2018-10-29 LAB
BASOPHILS # BLD: 0.2 % (ref 0–2)
DEPRECATED RDW RBC AUTO: 14.6 % (ref 11.6–15.6)
EOSINOPHIL # BLD: 0.5 % (ref 0–4.5)
HCT VFR BLD CALC: 23.6 % (ref 32.4–45.2)
HGB BLD-MCNC: 7.9 GM/DL (ref 10.7–15.3)
LYMPHOCYTES # BLD: 8.9 % (ref 8–40)
MCH RBC QN AUTO: 30.2 PG (ref 25.7–33.7)
MCHC RBC AUTO-ENTMCNC: 33.3 G/DL (ref 32–36)
MCV RBC: 90.6 FL (ref 80–96)
MONOCYTES # BLD AUTO: 10 % (ref 3.8–10.2)
NEUTROPHILS # BLD: 80.4 % (ref 42.8–82.8)
PLATELET # BLD AUTO: 232 K/MM3 (ref 134–434)
PMV BLD: 8.9 FL (ref 7.5–11.1)
RBC # BLD AUTO: 2.6 M/MM3 (ref 3.6–5.2)
WBC # BLD AUTO: 12.9 K/MM3 (ref 4–10.8)

## 2018-10-29 RX ADMIN — ENOXAPARIN SODIUM SCH MG: 40 INJECTION SUBCUTANEOUS at 10:19

## 2018-10-29 RX ADMIN — DOCUSATE SODIUM SCH MG: 100 CAPSULE, LIQUID FILLED ORAL at 21:24

## 2018-10-29 RX ADMIN — FERROUS SULFATE TAB EC 324 MG (65 MG FE EQUIVALENT) SCH MG: 324 (65 FE) TABLET DELAYED RESPONSE at 17:03

## 2018-10-29 RX ADMIN — ACETAMINOPHEN PRN MG: 325 TABLET ORAL at 05:59

## 2018-10-29 RX ADMIN — DOCUSATE SODIUM SCH MG: 100 CAPSULE, LIQUID FILLED ORAL at 13:42

## 2018-10-29 RX ADMIN — Medication SCH TAB: at 21:24

## 2018-10-29 RX ADMIN — FERROUS SULFATE TAB EC 324 MG (65 MG FE EQUIVALENT) SCH MG: 324 (65 FE) TABLET DELAYED RESPONSE at 08:13

## 2018-10-29 RX ADMIN — CEFTRIAXONE SCH MLS/HR: 1 INJECTION, POWDER, FOR SOLUTION INTRAMUSCULAR; INTRAVENOUS at 10:20

## 2018-10-29 RX ADMIN — Medication SCH TAB: at 10:20

## 2018-10-29 RX ADMIN — MULTIVITAMIN TABLET SCH TAB: TABLET at 10:20

## 2018-10-29 NOTE — PN
Progress Note (short form)





- Note


Progress Note: 


Pt lying in bed.


Notes some increased feeling of burning in her left foot.





 Last Vital Signs











Temp Pulse Resp BP Pulse Ox


 


 99.1 F   96 H  18   121/56 L  95 


 


 10/29/18 06:40  10/29/18 05:00  10/29/18 05:00  10/29/18 05:00  10/29/18 06:13














LLE


dressings cdi


calves soft NT





sensation intact to LT


2+ dp


EHL TA 4/5 


FHL G S 5/5





 Abnormal Lab Results











  10/28/18 10/28/18 10/28/18





  08:00 08:00 20:00


 


WBC  14.2 H  


 


RBC  2.97 L  


 


Hgb  8.9 L  


 


Hct  27.2 L  


 


Neutrophils %  83.8 H  


 


Lymphocytes %  7.6 L  


 


Creatinine   0.5 L 


 


Calcium   8.3 L 


 


Total Protein   5.5 L 


 


Albumin   2.7 L 


 


Urine Blood    Trace-intact H














  10/29/18





  07:20


 


WBC  12.9 H


 


RBC  2.60 L


 


Hgb  7.9 L


 


Hct  23.6 L


 


Neutrophils % 


 


Lymphocytes % 


 


Creatinine 


 


Calcium 


 


Total Protein 


 


Albumin 


 


Urine Blood 














a/p POD 3 L hip hemiarthroplasty


-pain control


-dvt proph


-despite hct drop pt is hemodynamically stable and will observe for now


-oob/PT


-f/u urine studies


-pt now AF but will observe for fever today - WBC trending down

## 2018-10-29 NOTE — PN
Physical Exam: 


SUBJECTIVE: Patient seen and examined, reports feeling tired, denies any 

shortness of breath, does report pain to left lower extremity upon movement, 

patient denies any paresthesia 








OBJECTIVE:Patient is a 86-year-old female with a past Surgical history of right 

hip replacement, patient was admitted from the emergency department to the 

medical surgical floor For a left femoral neck fracture secondary to a 

mechanical fall. Patient is S/P left hemiarthroplasty 10/26/2018, Dr. Bailey 

spinal anesthesia





 Vital Signs











 Period  Temp  Pulse  Resp  BP Sys/Waldrop  Pulse Ox


 


 Last 24 Hr  97.9 F-102.7 F    17-19  /42-59  95-98











GENERAL: The patient is awake, alert, and fully oriented, in no acute distress.


HEAD: Normal with no signs of trauma.


EYES: PERRL, extraocular movements intact, sclera anicteric, conjunctiva clear. 

No ptosis. 


ENT: Ears normal, nares patent, oropharynx clear without exudates, moist mucous 


membranes.


NECK: Trachea midline, full range of motion, supple. 


LUNGS: Breath sounds equal, clear to auscultation bilaterally, no wheezes, no 

crackles, no 


accessory muscle use. 


HEART: Regular rate and rhythm, S1, S2 without murmur, rub or gallop.


ABDOMEN: Soft, nontender, nondistended, normoactive bowel sounds, no guarding, 

no 


rebound, no hepatosplenomegaly, no masses.


EXTREMITIES: 2+ pulses, warm, well-perfused, no edema. 


LEFT LOWER EXTREMITY: aguacel dressing to left lateral hip, less than 3 second 

cappillary refill, +3 pedal pulse 


NEUROLOGICAL: Cranial nerves II through XII grossly intact. Normal speech, gait 

not 


observed.


PSYCH: Normal mood, normal affect.


SKIN: Warm, dry, normal turgor, no rashes or lesions noted














 Laboratory Results - last 24 hr








 CBC











WBC  12.9 K/mm3 (4.0-10.8)  H  10/29/18  07:20    


 


RBC  2.60 M/mm3 (3.60-5.2)  L  10/29/18  07:20    


 


Hgb  7.9 GM/dl (10.7-15.3)  L  10/29/18  07:20    


 


Hct  23.6 % (32.4-45.2)  L  10/29/18  07:20    


 


MCV  90.6 fl (80-96)   10/29/18  07:20    


 


MCH  30.2 pg (25.7-33.7)   10/29/18  07:20    


 


MCHC  33.3 g/dl (32.0-36.0)   10/29/18  07:20    


 


RDW  14.6 % (11.6-15.6)   10/29/18  07:20    


 


Plt Count  232 K/MM3 (134-434)   10/29/18  07:20    


 


MPV  8.9 fl (7.5-11.1)   10/29/18  07:20    


 


Absolute Neuts (auto)  10.4 K/mm3  10/29/18  07:20    


 


Neutrophils %  80.4 % (42.8-82.8)   10/29/18  07:20    


 


Neutrophils % (Manual)  93.0 % (42.8-82.8)  H*  10/25/18  21:10    


 


Band Neutrophils %  2.0 % (0-10)   10/25/18  21:10    


 


Lymphocytes %  8.9 % (8-40)   10/29/18  07:20    


 


Lymphocytes % (Manual)  3.0 % (8-40)  L  10/25/18  21:10    


 


Monocytes %  10.0 % (3.8-10.2)   10/29/18  07:20    


 


Monocytes % (Manual)  2 % (3.8-10.2)  L  10/25/18  21:10    


 


Eosinophils %  0.5 % (0-4.5)   10/29/18  07:20    


 


Basophils %  0.2 % (0-2.0)   10/29/18  07:20    


 


Platelet Estimate  Adequate   10/25/18  21:10    








 CMP











Sodium  136 mmol/L (136-145)   10/28/18  08:00    


 


Potassium  3.8 mmol/L (3.5-5.1)   10/28/18  08:00    


 


Chloride  101 mmol/L ()   10/28/18  08:00    


 


Carbon Dioxide  27 mmol/L (22-28)   10/28/18  08:00    


 


Anion Gap  8 MMOL/L (8-16)   10/28/18  08:00    


 


BUN  16 mg/dl (7-18)   10/28/18  08:00    


 


Creatinine  0.5 mg/dl (0.6-1.3)  L  10/28/18  08:00    


 


Creat Clearance w eGFR  > 60  (>60)   10/28/18  08:00    


 


Random Glucose  90 mg/dl ()  D 10/28/18  08:00    


 


Lactic Acid  1.5 mmol/L (0.4-2.0)   10/28/18  17:55    


 


Calcium  8.3 mg/dl (8.4-10.2)  L  10/28/18  08:00    


 


Phosphorus  2.4 mg/dl (2.5-4.6)  L  10/27/18  07:25    


 


Magnesium  2.0 mg/dL (1.8-2.4)   10/28/18  08:00    


 


Total Bilirubin  0.6 mg/dl (0.2-1.0)   10/28/18  08:00    


 


AST  22 U/L (10-42)   10/28/18  08:00    


 


ALT  12 U/L (10-40)  D 10/28/18  08:00    


 


Alkaline Phosphatase  56 U/L (32-92)  D 10/28/18  08:00    


 


Total Protein  5.5 g/dl (6.4-8.3)  L  10/28/18  08:00    


 


Albumin  2.7 g/dl (3.5-5.0)  L  10/28/18  08:00    











Active Medications











Generic Name Dose Route Start Last Admin





  Trade Name Peewee  PRN Reason Stop Dose Admin


 


Acetaminophen  650 mg  10/27/18 22:28  10/29/18 05:59





  Tylenol -  PO   650 mg





  Q6H PRN   Administration





  FEVER   





     





     





     


 


Calcium Carbonate/Cholecalciferol  1 tab  10/27/18 22:00  10/28/18 21:17





  Os-Rodger 500+D -  PO   1 tab





  BID JUAN R   Administration





     





     





     





     


 


Enoxaparin Sodium  40 mg  10/28/18 10:00  10/28/18 09:03





  Lovenox -  SQ   40 mg





  DAILY JUAN R   Administration





     





     





     





     


 


Ferrous Sulfate  325 mg  10/27/18 17:30  10/29/18 08:13





  Feosol -  PO   325 mg





  BIDWM JUAN R   Administration





     





     





     





     


 


Multivitamins/Minerals/Vitamin C  1 tab  10/28/18 10:00  10/28/18 09:03





  Tab-A-Vit -  PO   1 tab





  DAILY JUAN R   Administration





     





     





     





     


 


Oxycodone HCl  2.5 mg  10/27/18 22:27  





  Roxicodone -  PO   





  Q6H PRN   





  PAIN LEVEL 1-5   





     





     





     


 


Oxycodone HCl  5 mg  10/27/18 22:27  10/29/18 08:13





  Roxicodone -  PO   5 mg





  Q6H PRN   Administration





  PAIN LEVEL 6-10   





     





     





     








 Microbiology





10/26/18 08:00   Urine - Urine Clean Catch   Urine Culture - Final


                            Contaminated: Please Repeat





IMAGING


chest xray 10/28/18: no infilitrate no effusion noted


ray of left hip/pelvis: +femoral neck fracture 








ASSESSMENT/PLAN:





1) MS


Left hip fracture s/p hemiarthroplasty, POD #2


- prn pain medication


- physical therapy-->weigt bearing as tolerated


- incentive spirometer 





2) ID


fever


- tmax 101.5, leukocytosis is downtrending, urine culture (repeat) pending, cxr 

reviewed, pending blood culture


- pt does report increased urinary  urgency since yesterday, will start 

rocephin pending culture


- continue to trend wbc and fever curve





3) heme


Acute blood loss anemia 


- secondary to recent orthopedic surgery, repeat hgb 7.9, close following, will 

transfuse if less than 7


- continue iron supplement


*Hypomagnesemia- resolved 


- s/p replacement


- will f/u on Bmet     


      


* F/E/N: Regular diet 


Replace electrolytes as needed





*DVT prophylaxis: subq Lovenox 





Dispo: Pt is requesting snf for short term rehab, discussed with , 

pt is not medically stable for discharge.Requires Inpatient Care.





Visit type





- Emergency Visit


Emergency Visit: Yes


ED Registration Date: 10/25/18


Care time: The patient presented to the Emergency Department on the above date 

and was hospitalized for further evaluation of their emergent condition.





- New Patient


This patient is new to me today: No





- Critical Care


Critical Care patient: No





- Discharge Referral


Referred to [facility] No

## 2018-10-29 NOTE — SURG
Surgery First Assist Note


First Assist: Nicole Meza PA-C


Date of Service: 10/26/18


Diagnosis: 





Left hip basicervcial fracture


Procedure: 





left hip-hemiarthroplasty


I was present for the entirety of the operative procedure. For further detail, 

please refer to operative report.








Visit type





- Case Type


Case Type: ED Admission





- Emergency


Emergency Visit: Yes


ED Registration Date: 10/25/18


Care time: The patient presented to the Emergency Department on the above date 

and was hospitalized for further evaluation of their emergent condition.





- New patient


This patient is new to me today: Yes


Date on this admission: 10/26/18

## 2018-10-29 NOTE — CONS
DATE OF CONSULTATION:  

 

DATE OF DICTATION:  10/27/2018

 

TIME:  10 a.m.

 

CHIEF COMPLAINT:  Left hip injury.

 

HISTORY OF PRESENT ILLNESS:  This is a pleasant 86-year-old female who had suffered a

fall on to her left side.  She was found to have a displaced basicervical hip

fracture and orthopedic consultation was called.  She notes only pain in the hip. 

She denies any loss of consciousness.  She denies pain in any other joints.  She

denies any radiating pain, numbness or tingling.

 

PAST MEDICAL HISTORY:  Denies.

 

PAST SURGICAL HISTORY:  Significant for right total hip.

 

SOCIAL HISTORY:  Denies any alcohol, tobacco or drugs.

 

FAMILY HISTORY:  Noncontributory.

 

ALLERGIES:  No known drug allergies.

 

MEDICATIONS:  No home medications.

 

REVIEW OF SYMPTOMS:  Negative for any fever, chills, weakness, rhinorrhea, congestion

or throat pain, chest pain, syncope or palpitations, shortness of breath, cough or

dyspnea, abdominal pain, distention or nausea.

 

PHYSICAL EXAMINATION:

General:  This is a well-appearing female in no acute distress.  She is alert and

oriented x3.  She is seen lying on a hospital stretcher.

Extremities:  The left lower extremity is shortened and externally rotated. The knee

is nontender.  The ankle is nontender.  Distally sensation is intact to light touch. 

DP pulse 2+.  Distal motor 5/5.

 

RADIOGRAPHS:  Reviewed, demonstrating a displaced basicervical femoral neck fracture

with extension both into the neck and into the greater and lesser trochanters with

displacement.

 

LABORATORY RESULTS:  Reviewed, demonstrating possible mild UA.

 

ASSESSMENT:  Left basicervical femoral neck fracture.

 

PLAN:  I reviewed today's findings with the patient.  I advise that she has suffered

a comminuted and displaced fracture to her left hip.  We discussed the option of

nonoperative care with prolonged bedrest.  We reviewed operative treatment which

would best be done with a surgical procedure.  We discussed surgical options

including IM nail which given the comminution present with this fracture we believe

to have resulted in significant shortening and possible failure of fixation or AVN. 

Discussed the option of hemiarthroplasty versus total hip arthroplasty.  I reviewed

the hemiarthroplasty involves a shorter surgery with less blood loss, less risk of

dislocation.  There is a greater chance of postoperative pain in the hip.  Discussed

the option of total hip arthroplasty which involves a little bit more surgery and

more risk of dislocation and increased blood loss.  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

ambulator with a walker and was not experiencing any hip arthritis pain prior to

this, I recommend that a hemiarthroplasty is the best option.  After addressing the

patient's questions she elected for this.  We did discuss surgical risks in detail

including bleeding, infection, neurovascular injury, need for further surgery,

postoperative pain and stiffness, _____ osteoarthritis, limb length discrepancy or

rotational deformity, hip instability, failure,  prosthetic fracture.  We reviewed 

medical risks such as heart attack, stroke, DVT, PE and death.  I reviewed the

postoperative rehabilitation protocol.  I addressed all the patient's questions and

concerns.  We also reviewed the use of perioperative DVT and antibiotic prophylaxis. 

The patient voiced understanding and is electing to proceed surgically.  She will be

brought to the OR later today.

 

 

NADJA JACKMAN M.D.

 

[id number]

DD: 10/27/2018 12:15

DT: 10/27/2018 13:11

Job #:  [id number]

## 2018-10-30 VITALS — DIASTOLIC BLOOD PRESSURE: 54 MMHG | HEART RATE: 90 BPM | SYSTOLIC BLOOD PRESSURE: 129 MMHG

## 2018-10-30 LAB
ALBUMIN SERPL-MCNC: 2.2 G/DL (ref 3.5–5)
ALP SERPL-CCNC: 48 U/L (ref 32–92)
ALT SERPL-CCNC: 14 U/L (ref 10–40)
ANION GAP SERPL CALC-SCNC: 6 MMOL/L (ref 8–16)
AST SERPL-CCNC: 19 U/L (ref 10–42)
BILIRUB SERPL-MCNC: 0.6 MG/DL (ref 0.2–1)
BUN SERPL-MCNC: 23 MG/DL (ref 7–18)
CALCIUM SERPL-MCNC: 8.3 MG/DL (ref 8.4–10.2)
CHLORIDE SERPL-SCNC: 103 MMOL/L (ref 98–107)
CO2 SERPL-SCNC: 28 MMOL/L (ref 22–28)
CREAT SERPL-MCNC: 0.6 MG/DL (ref 0.6–1.3)
DEPRECATED RDW RBC AUTO: 14.2 % (ref 11.6–15.6)
GLUCOSE SERPL-MCNC: 98 MG/DL (ref 74–106)
HCT VFR BLD CALC: 22.1 % (ref 32.4–45.2)
HGB BLD-MCNC: 7.5 GM/DL (ref 10.7–15.3)
MCH RBC QN AUTO: 30.7 PG (ref 25.7–33.7)
MCHC RBC AUTO-ENTMCNC: 34 G/DL (ref 32–36)
MCV RBC: 90.2 FL (ref 80–96)
PLATELET # BLD AUTO: 270 K/MM3 (ref 134–434)
PMV BLD: 8.1 FL (ref 7.5–11.1)
POTASSIUM SERPLBLD-SCNC: 3.9 MMOL/L (ref 3.5–5.1)
PROT SERPL-MCNC: 5.2 G/DL (ref 6.4–8.3)
RBC # BLD AUTO: 2.45 M/MM3 (ref 3.6–5.2)
SODIUM SERPL-SCNC: 137 MMOL/L (ref 136–145)
WBC # BLD AUTO: 9.6 K/MM3 (ref 4–10.8)

## 2018-10-30 RX ADMIN — DOCUSATE SODIUM SCH MG: 100 CAPSULE, LIQUID FILLED ORAL at 05:47

## 2018-10-30 RX ADMIN — Medication SCH TAB: at 09:18

## 2018-10-30 RX ADMIN — CEFTRIAXONE SCH MLS/HR: 1 INJECTION, POWDER, FOR SOLUTION INTRAMUSCULAR; INTRAVENOUS at 09:17

## 2018-10-30 RX ADMIN — ACETAMINOPHEN PRN MG: 325 TABLET ORAL at 09:20

## 2018-10-30 RX ADMIN — FERROUS SULFATE TAB EC 324 MG (65 MG FE EQUIVALENT) SCH MG: 324 (65 FE) TABLET DELAYED RESPONSE at 09:18

## 2018-10-30 RX ADMIN — MULTIVITAMIN TABLET SCH TAB: TABLET at 09:19

## 2018-10-30 RX ADMIN — ENOXAPARIN SODIUM SCH MG: 40 INJECTION SUBCUTANEOUS at 09:18

## 2018-10-30 NOTE — DS
Physical Exam: 


SUBJECTIVE: Patient seen and examined, sitting up in bed, tolerating diet, 

reports pain to the left lower extremity upon movement, denies any paresthesia 

to the extremity.  





OBJECTIVE:Patient is a 85 y/o female with a past surgical history of a right 

hip replacement. She reports yesterday (10/25/18) she tripped and fell onto her 

left hip.  Patient is able to recall the full incident in detail and denies 

striking her head.  Of note, patient does report feeling off balance within the 

past several months and was evaluated by a neurologist at [facility].  She underwent 

head ct and mri of brain which resulted as negative as per patient.   She 

reports left hip pain and was brought to the emergency department for further 

evaluation.  





ER course was notable for:


(1)xray of left hip/pelvis: +femoral neck fracture 


(2)chest xray: no infilitrate no effusions noted 


(3)ekg nsr    








 Vital Signs











 Period  Temp  Pulse  Resp  BP Sys/Waldrop  Pulse Ox


 


 Last 24 Hr  97.3 F-99.0 F    16-20  110-132/50-66  95-97








PHYSICAL EXAM





GENERAL: The patient is awake, alert, and fully oriented, in no acute distress.


HEAD: Normal with no signs of trauma.


EYES: PERRL, extraocular movements intact, sclera anicteric, conjunctiva clear. 

No ptosis. 


ENT: Ears normal, nares patent, oropharynx clear without exudates, moist mucous 


membranes.


NECK: Trachea midline, full range of motion, supple. 


LUNGS: Breath sounds equal, clear to auscultation bilaterally, no wheezes, no 

crackles, no 


accessory muscle use. 


HEART: Regular rate and rhythm, S1, S2 without murmur, rub or gallop.


ABDOMEN: Soft, nontender, nondistended, normoactive bowel sounds, no guarding, 

no 


rebound, no hepatosplenomegaly, no masses.


EXTREMITIES: 2+ pulses, warm, well-perfused, no edema. 


LEFT LOWER EXTREMITY: aguacel dressing to left lateral hip, less than 3 second 

cappillary refill, +3 pedal pulse 


NEUROLOGICAL: Cranial nerves II through XII grossly intact. Normal speech, gait 

not 


observed.


PSYCH: Normal mood, normal affect.


SKIN: Warm, dry, normal turgor, no rashes or lesions noted





LABS


 Laboratory Results - last 24 hr











  10/30/18 10/30/18





  07:56 07:56


 


WBC  9.6 


 


RBC  2.45 L 


 


Hgb  7.5 L 


 


Hct  22.1 L 


 


MCV  90.2 


 


MCH  30.7 


 


MCHC  34.0 


 


RDW  14.2 


 


Plt Count  270 


 


MPV  8.1 


 


Sodium   137


 


Potassium   3.9


 


Chloride   103


 


Carbon Dioxide   28


 


Anion Gap   6 L


 


BUN   23 H


 


Creatinine   0.6


 


Creat Clearance w eGFR   > 60


 


Random Glucose   98


 


Calcium   8.3 L


 


Total Bilirubin   0.6


 


AST   19


 


ALT   14


 


Alkaline Phosphatase   48


 


Total Protein   5.2 L


 


Albumin   2.2 L








 Microbiology





10/28/18 20:00   Urine - Urine - Catheterized   Urine Culture - Final


                            NO GROWTH OBTAINED


10/28/18 17:55   Blood - Peripheral Venous   Blood Culture - Preliminary


                            NO GROWTH OBTAINED AFTER 24 HOURS, INCUBATION TO 

CONTINUE


                            FOR 4 DAYS.


10/28/18 17:55   Blood - Peripheral Venous   Blood Culture - Preliminary


                            NO GROWTH OBTAINED AFTER 24 HOURS, INCUBATION TO 

CONTINUE


                            FOR 4 DAYS.


10/26/18 08:00   Urine - Urine Clean Catch   Urine Culture - Final


                            Contaminated: Please Repeat


IMAGING


chest xray 10/28/18: no infilitrate no effusion noted


xray of left hip/pelvis: +femoral neck fracture 








HOSPITAL COURSE:


   Patient was admitted from the emergency department to the medical surgical 

floor status post mechanical fall on 10/26/2018. Orthopedic surgeon Dr. Flako Bailey was consulted on hospital day 1. Patient underwent a left 

hemiarthroplasty on 10/26/2018.  She ambulated on postoperative day one with 

the assistance of a physical therapist. Patient was noted to ambulate 5 feet 

with a walker and person assist. Recommendation was made for inpatient short-

term rehabilitation.  Pain was controlled with both narcotic and nonnarcotic 

analgesics.  Of note urinalysis upon admission was noted to have 1+ leukocytes 

In addition patient reports increased urinary frequency and urgency.   She was 

treated for an acute urinary tract infection with Rocephin.   On postoperative 

day 2 patient was noted to have fever 101.5 with leukocytosis, blood and urine 

culture resulted as negative.  Repeat  chest x-ray completed on October 28 2018th was negative for any infiltrates or effusions.  Patient is afebrile 

leukocytosis has resolved.  Acute blood loss anemia noted secondary to recent 

orthopedic surgery. Patient is asymptomatic.  





PLAN:


- Lengthy discussion with patient and son atient will be transferred to short-

term rehabilitation


- keflex 500mg po bid for 7 days


- Case discussed with patient's primary care physician Dr.A Dodge, agrees with 

plan strict follow-up with primary care physician within one week after 

discharge for short-term rehabilitation


- Strict follow-up with orthopedist Dr. Bailey within 1 week 


- return precautions reviewed with patient.





Date of Admission:10/25/18





Date of Discharge: 10/30/18











Minutes to complete discharge: 45





Discharge Summary


Reason For Visit: FALL


Current Active Problems





Basicervical fracture of neck of left femur (Acute)


Hip fracture (Acute)








Condition: Guarded





- Instructions


Referrals: 


Dylon Dodge MD [Primary Care Provider] - 





- Home Medications


Comprehensive Discharge Medication List: 


Ambulatory Orders





Aspirin [Ecotrin] 81 mg PO DAILY 10/25/18 


RX: Ibuprofen 400 mg PO ONCE 10/25/18 








This patient is new to me today: No


Emergency Visit: Yes


ED Registration Date: 10/25/18


Care time: The patient presented to the Emergency Department on the above date 

and was hospitalized for further evaluation of their emergent condition.


Critical Care patient: No





- Discharge Referral


Referred to [facility] Yes


Physician Referral: Dylon Dodge MD (Int Med)

## 2018-11-05 NOTE — PATH
Surgical Pathology Report



Patient Name:  SHAYY REBOLLEDO

Accession #:  [id number]

Med. Rec. #:  [id number]                                                        

   /Age/Gender:  1932 (Age: 86) / F

Account:  [id number]                                                          

             Location: [facility]

Taken:  10/26/2018

Received:  10/26/2018

Reported:  2018

Physicians:  Flako Bailey M.D.

  



Specimen(s) Received

 LEFT FEMORAL HEAD 





Clinical History

Left femoral neck fracture







Final Diagnosis

FEMORAL HEAD, LEFT, TOTAL HIP REPLACEMENT:

FRACTURE (CLINICAL).

DEGENERATIVE JOINT DISEASE.





***Electronically Signed***

Dejah Quintanilla M.D.





Gross Description

Received in formalin, labeled "left femoral head," is a 4.5 x 4.5 x 4.0 cm.

femoral head with a 2.3 cm in length portion of femoral neck attached. The

margin of resection is red-brown, jagged and hemorrhagic. No areas of eburnation

are identified. The articular surface is tan-yellow and diffusely granular. The

underlying trabecular bone is yellow, hard and focally hemorrhagic. A

representative section is submitted in one cassette, following decalcification. 



/10/29/2018



[facility]/10/29/2018